# Patient Record
Sex: MALE | Race: WHITE | Employment: FULL TIME
[De-identification: names, ages, dates, MRNs, and addresses within clinical notes are randomized per-mention and may not be internally consistent; named-entity substitution may affect disease eponyms.]

---

## 2022-03-19 PROBLEM — F41.9 ANXIETY: Status: ACTIVE | Noted: 2019-03-05

## 2022-08-29 DIAGNOSIS — Z13.89 SCREENING FOR BLOOD OR PROTEIN IN URINE: ICD-10-CM

## 2022-08-29 DIAGNOSIS — Z13.220 SCREENING FOR HYPERLIPIDEMIA: Primary | ICD-10-CM

## 2022-08-29 DIAGNOSIS — Z13.29 SCREENING FOR THYROID DISORDER: ICD-10-CM

## 2022-08-29 DIAGNOSIS — Z13.220 SCREENING FOR HYPERCHOLESTEROLEMIA: ICD-10-CM

## 2022-08-30 ENCOUNTER — NURSE ONLY (OUTPATIENT)
Dept: FAMILY MEDICINE CLINIC | Facility: CLINIC | Age: 32
End: 2022-08-30

## 2022-08-30 DIAGNOSIS — Z13.89 SCREENING FOR BLOOD OR PROTEIN IN URINE: ICD-10-CM

## 2022-08-30 DIAGNOSIS — Z13.29 SCREENING FOR THYROID DISORDER: ICD-10-CM

## 2022-08-30 DIAGNOSIS — Z13.220 SCREENING FOR HYPERLIPIDEMIA: ICD-10-CM

## 2022-08-30 DIAGNOSIS — Z13.220 SCREENING FOR HYPERCHOLESTEROLEMIA: ICD-10-CM

## 2022-08-30 LAB
ALBUMIN SERPL-MCNC: 4.1 G/DL (ref 3.5–5)
ALBUMIN/GLOB SERPL: 1.3 {RATIO} (ref 1.2–3.5)
ALP SERPL-CCNC: 69 U/L (ref 50–136)
ALT SERPL-CCNC: 24 U/L (ref 12–65)
ANION GAP SERPL CALC-SCNC: 5 MMOL/L (ref 7–16)
AST SERPL-CCNC: 16 U/L (ref 15–37)
BILIRUB SERPL-MCNC: 0.9 MG/DL (ref 0.2–1.1)
BUN SERPL-MCNC: 13 MG/DL (ref 6–23)
CALCIUM SERPL-MCNC: 9.2 MG/DL (ref 8.3–10.4)
CHLORIDE SERPL-SCNC: 105 MMOL/L (ref 98–107)
CHOLEST SERPL-MCNC: 189 MG/DL
CO2 SERPL-SCNC: 28 MMOL/L (ref 21–32)
CREAT SERPL-MCNC: 1 MG/DL (ref 0.8–1.5)
GLOBULIN SER CALC-MCNC: 3.2 G/DL (ref 2.3–3.5)
GLUCOSE SERPL-MCNC: 91 MG/DL (ref 65–100)
HDLC SERPL-MCNC: 35 MG/DL (ref 40–60)
HDLC SERPL: 5.4 {RATIO}
LDLC SERPL CALC-MCNC: 114 MG/DL
POTASSIUM SERPL-SCNC: 3.9 MMOL/L (ref 3.5–5.1)
PROT SERPL-MCNC: 7.3 G/DL (ref 6.3–8.2)
SODIUM SERPL-SCNC: 138 MMOL/L (ref 138–145)
TRIGL SERPL-MCNC: 200 MG/DL (ref 35–150)
TSH, 3RD GENERATION: 1.73 UIU/ML (ref 0.36–3.74)
VLDLC SERPL CALC-MCNC: 40 MG/DL (ref 6–23)

## 2022-08-31 LAB
APPEARANCE UR: CLEAR
BACTERIA URNS QL MICRO: NEGATIVE /HPF
BASOPHILS # BLD: 0 K/UL (ref 0–0.2)
BASOPHILS NFR BLD: 1 % (ref 0–2)
BILIRUB UR QL: NEGATIVE
CASTS URNS QL MICRO: ABNORMAL /LPF
COLOR UR: ABNORMAL
DIFFERENTIAL METHOD BLD: ABNORMAL
EOSINOPHIL # BLD: 0.2 K/UL (ref 0–0.8)
EOSINOPHIL NFR BLD: 3 % (ref 0.5–7.8)
EPI CELLS #/AREA URNS HPF: ABNORMAL /HPF
ERYTHROCYTE [DISTWIDTH] IN BLOOD BY AUTOMATED COUNT: 13.1 % (ref 11.9–14.6)
GLUCOSE UR STRIP.AUTO-MCNC: NEGATIVE MG/DL
HCT VFR BLD AUTO: 49.3 % (ref 41.1–50.3)
HGB BLD-MCNC: 15.4 G/DL (ref 13.6–17.2)
HGB UR QL STRIP: NEGATIVE
IMM GRANULOCYTES # BLD AUTO: 0 K/UL (ref 0–0.5)
IMM GRANULOCYTES NFR BLD AUTO: 0 % (ref 0–5)
KETONES UR QL STRIP.AUTO: NEGATIVE MG/DL
LEUKOCYTE ESTERASE UR QL STRIP.AUTO: NEGATIVE
LYMPHOCYTES # BLD: 1.8 K/UL (ref 0.5–4.6)
LYMPHOCYTES NFR BLD: 32 % (ref 13–44)
MCH RBC QN AUTO: 31.7 PG (ref 26.1–32.9)
MCHC RBC AUTO-ENTMCNC: 31.2 G/DL (ref 31.4–35)
MCV RBC AUTO: 101.4 FL (ref 79.6–97.8)
MONOCYTES # BLD: 0.5 K/UL (ref 0.1–1.3)
MONOCYTES NFR BLD: 9 % (ref 4–12)
MUCOUS THREADS URNS QL MICRO: 0 /LPF
NEUTS SEG # BLD: 3 K/UL (ref 1.7–8.2)
NEUTS SEG NFR BLD: 55 % (ref 43–78)
NITRITE UR QL STRIP.AUTO: NEGATIVE
NRBC # BLD: 0 K/UL (ref 0–0.2)
PH UR STRIP: 7.5 [PH] (ref 5–9)
PLATELET # BLD AUTO: 302 K/UL (ref 150–450)
PMV BLD AUTO: 9.9 FL (ref 9.4–12.3)
PROT UR STRIP-MCNC: NEGATIVE MG/DL
RBC # BLD AUTO: 4.86 M/UL (ref 4.23–5.6)
RBC #/AREA URNS HPF: ABNORMAL /HPF
SP GR UR REFRACTOMETRY: 1.02 (ref 1–1.02)
URINE CULTURE IF INDICATED: ABNORMAL
UROBILINOGEN UR QL STRIP.AUTO: 1 EU/DL (ref 0.2–1)
WBC # BLD AUTO: 5.5 K/UL (ref 4.3–11.1)
WBC URNS QL MICRO: ABNORMAL /HPF

## 2022-09-09 ENCOUNTER — OFFICE VISIT (OUTPATIENT)
Dept: FAMILY MEDICINE CLINIC | Facility: CLINIC | Age: 32
End: 2022-09-09
Payer: COMMERCIAL

## 2022-09-09 VITALS
SYSTOLIC BLOOD PRESSURE: 138 MMHG | DIASTOLIC BLOOD PRESSURE: 86 MMHG | RESPIRATION RATE: 18 BRPM | OXYGEN SATURATION: 96 % | WEIGHT: 257.2 LBS | HEART RATE: 100 BPM

## 2022-09-09 DIAGNOSIS — F41.9 ANXIETY DISORDER, UNSPECIFIED TYPE: Primary | ICD-10-CM

## 2022-09-09 DIAGNOSIS — Z00.00 HEALTHCARE MAINTENANCE: ICD-10-CM

## 2022-09-09 PROCEDURE — 99395 PREV VISIT EST AGE 18-39: CPT | Performed by: FAMILY MEDICINE

## 2022-09-09 RX ORDER — ESCITALOPRAM OXALATE 10 MG/1
5 TABLET ORAL DAILY
Qty: 90 TABLET | Refills: 3 | Status: SHIPPED | OUTPATIENT
Start: 2022-09-09

## 2022-09-09 ASSESSMENT — PATIENT HEALTH QUESTIONNAIRE - PHQ9
SUM OF ALL RESPONSES TO PHQ QUESTIONS 1-9: 0
2. FEELING DOWN, DEPRESSED OR HOPELESS: 0
1. LITTLE INTEREST OR PLEASURE IN DOING THINGS: 0
SUM OF ALL RESPONSES TO PHQ9 QUESTIONS 1 & 2: 0

## 2022-09-09 ASSESSMENT — ANXIETY QUESTIONNAIRES
1. FEELING NERVOUS, ANXIOUS, OR ON EDGE: 0
5. BEING SO RESTLESS THAT IT IS HARD TO SIT STILL: 0
6. BECOMING EASILY ANNOYED OR IRRITABLE: 0
4. TROUBLE RELAXING: 0
GAD7 TOTAL SCORE: 0
7. FEELING AFRAID AS IF SOMETHING AWFUL MIGHT HAPPEN: 0
2. NOT BEING ABLE TO STOP OR CONTROL WORRYING: 0
IF YOU CHECKED OFF ANY PROBLEMS ON THIS QUESTIONNAIRE, HOW DIFFICULT HAVE THESE PROBLEMS MADE IT FOR YOU TO DO YOUR WORK, TAKE CARE OF THINGS AT HOME, OR GET ALONG WITH OTHER PEOPLE: NOT DIFFICULT AT ALL
3. WORRYING TOO MUCH ABOUT DIFFERENT THINGS: 0

## 2022-09-19 ASSESSMENT — ENCOUNTER SYMPTOMS
EYES NEGATIVE: 1
ABDOMINAL PAIN: 1
RESPIRATORY NEGATIVE: 1

## 2022-09-20 NOTE — PROGRESS NOTES
HISTORY OF PRESENT ILLNESS  Avila Bolaños is a 33 y.o. y.o. male    Other  This is a new (physical) problem. The problem has been gradually improving. Associated symptoms include abdominal pain and arthralgias. Mental Health Problem  Primary symptoms comment: anxiety. The current episode started more than 1 month ago. The onset of the illness is precipitated by a stressful event and emotional stress. Additional symptoms of the illness include abdominal pain. No Known Allergies     Current Outpatient Medications   Medication Sig    escitalopram (LEXAPRO) 10 MG tablet Take 0.5 tablets by mouth daily     No current facility-administered medications for this visit. Past Medical History:   Diagnosis Date    Anxiety     1st year of college        Past Surgical History:   Procedure Laterality Date    APPENDECTOMY  2008        Social History     Socioeconomic History    Marital status: Single     Spouse name: Not on file    Number of children: Not on file    Years of education: Not on file    Highest education level: Not on file   Occupational History    Not on file   Tobacco Use    Smoking status: Former    Smokeless tobacco: Never    Tobacco comments:     Quit smoking: only light smoker in college--none for years   Substance and Sexual Activity    Alcohol use: Yes     Alcohol/week: 10.0 standard drinks    Drug use: Not on file    Sexual activity: Not on file   Other Topics Concern    Not on file   Social History Narrative    Not on file     Social Determinants of Health     Financial Resource Strain: Not on file   Food Insecurity: Not on file   Transportation Needs: Not on file   Physical Activity: Not on file   Stress: Not on file   Social Connections: Not on file   Intimate Partner Violence: Not on file   Housing Stability: Not on file        Review of Systems   Constitutional: Negative. HENT: Negative. Eyes: Negative. Respiratory: Negative. Cardiovascular: Negative.     Gastrointestinal: Positive for abdominal pain. Endocrine: Negative. Genitourinary: Negative. Musculoskeletal:  Positive for arthralgias. Skin: Negative. Neurological: Negative. Psychiatric/Behavioral: Negative. /86   Pulse 100   Resp 18   Wt 257 lb 3.2 oz (116.7 kg)   SpO2 96%      Physical Exam  Constitutional:       General: He is not in acute distress. Appearance: Normal appearance. HENT:      Head: Normocephalic. Nose: Nose normal.   Eyes:      Conjunctiva/sclera: Conjunctivae normal.   Cardiovascular:      Rate and Rhythm: Normal rate. Pulmonary:      Effort: Pulmonary effort is normal.      Breath sounds: Normal breath sounds. Abdominal:      General: Abdomen is flat. Bowel sounds are normal.      Palpations: Abdomen is soft. Musculoskeletal:         General: Normal range of motion. Cervical back: Normal range of motion. Skin:     General: Skin is warm and dry. Neurological:      General: No focal deficit present. Mental Status: He is alert.    Psychiatric:         Mood and Affect: Mood normal.       Nurse Only on 08/30/2022   Component Date Value Ref Range Status    Color, UA 08/30/2022 YELLOW/STRAW    Final    Color Reference Range: Straw, Yellow or Dark Yellow    Appearance 08/30/2022 CLEAR    Final    Specific Gravity, UA 08/30/2022 1.020  1.001 - 1.023   Final    pH, Urine 08/30/2022 7.5  5.0 - 9.0   Final    Protein, UA 08/30/2022 Negative  Negative mg/dL Final    Glucose, UA 08/30/2022 Negative  mg/dL Final    Ketones, Urine 08/30/2022 Negative  Negative mg/dL Final    Bilirubin Urine 08/30/2022 Negative  Negative   Final    Blood, Urine 08/30/2022 Negative  Negative   Final    Urobilinogen, Urine 08/30/2022 1.0  0.2 - 1.0 EU/dL Final    Nitrite, Urine 08/30/2022 Negative  Negative   Final    Leukocyte Esterase, Urine 08/30/2022 Negative  Negative   Final    Urine Culture if Indicated 08/30/2022 CULTURE NOT INDICATED BY UA RESULT    Final    WBC, UA 08/30/2022 0-4 (A) NORMAL /hpf Final    RBC, UA 08/30/2022 0-5 (A) NORMAL /hpf Final    BACTERIA, URINE 08/30/2022 Negative (A) NORMAL /hpf Final    Epithelial Cells UA 08/30/2022 0-5 (A) NORMAL /hpf Final    Casts 08/30/2022 0-2 (A) NORMAL /lpf Final    Mucus, UA 08/30/2022 0  0 /lpf Final    TSH, 3RD GENERATION 08/30/2022 1.730  0.358 - 3.740 uIU/mL Final    Cholesterol, Total 08/30/2022 189  <200 MG/DL Final    Comment: Borderline High: 200-239 mg/dL  High: Greater than or equal to 240 mg/dL      Triglycerides 08/30/2022 200 (A) 35 - 150 MG/DL Final    Comment: Borderline High: 150-199 mg/dL, High: 200-499 mg/dL  Very High: Greater than or equal to 500 mg/dL      HDL 08/30/2022 35 (A) 40 - 60 MG/DL Final    LDL Calculated 08/30/2022 114 (A) <100 MG/DL Final    Comment: Near Optimal: 100-129 mg/dL  Borderline High: 130-159, High: 160-189 mg/dL  Very High: Greater than or equal to 190 mg/dL      VLDL Cholesterol Calculated 08/30/2022 40 (A) 6.0 - 23.0 MG/DL Final    Chol/HDL Ratio 08/30/2022 5.4    Final    Sodium 08/30/2022 138  138 - 145 mmol/L Final    Potassium 08/30/2022 3.9  3.5 - 5.1 mmol/L Final    Chloride 08/30/2022 105  98 - 107 mmol/L Final    CO2 08/30/2022 28  21 - 32 mmol/L Final    Anion Gap 08/30/2022 5 (A) 7 - 16 mmol/L Final    Glucose 08/30/2022 91  65 - 100 mg/dL Final    BUN 08/30/2022 13  6 - 23 MG/DL Final    Creatinine 08/30/2022 1.00  0.8 - 1.5 MG/DL Final    GFR  08/30/2022 >60  >60 ml/min/1.73m2 Final    GFR Non- 08/30/2022 >60  >60 ml/min/1.73m2 Final    Comment:   Estimated GFR is calculated using the Modification of Diet in Renal Disease (MDRD) Study equation, reported for both  Americans (GFRAA) and non- Americans (GFRNA), and normalized to 1.73m2 body surface area. The physician must decide which value applies to the patient. The MDRD study equation should only be used in individuals age 25 or older.  It has not been validated for the following: pregnant women, patients with serious comorbid conditions,or on certain medications, or persons with extremes of body size, muscle mass, or nutritional status.       Calcium 08/30/2022 9.2  8.3 - 10.4 MG/DL Final    Total Bilirubin 08/30/2022 0.9  0.2 - 1.1 MG/DL Final    ALT 08/30/2022 24  12 - 65 U/L Final    AST 08/30/2022 16  15 - 37 U/L Final    Alk Phosphatase 08/30/2022 69  50 - 136 U/L Final    Total Protein 08/30/2022 7.3  6.3 - 8.2 g/dL Final    Albumin 08/30/2022 4.1  3.5 - 5.0 g/dL Final    Globulin 08/30/2022 3.2  2.3 - 3.5 g/dL Final    Albumin/Globulin Ratio 08/30/2022 1.3  1.2 - 3.5   Final    WBC 08/30/2022 5.5  4.3 - 11.1 K/uL Final    RBC 08/30/2022 4.86  4.23 - 5.6 M/uL Final    Hemoglobin 08/30/2022 15.4  13.6 - 17.2 g/dL Final    Hematocrit 08/30/2022 49.3  41.1 - 50.3 % Final    MCV 08/30/2022 101.4 (A) 79.6 - 97.8 FL Final    MCH 08/30/2022 31.7  26.1 - 32.9 PG Final    MCHC 08/30/2022 31.2 (A) 31.4 - 35.0 g/dL Final    RDW 08/30/2022 13.1  11.9 - 14.6 % Final    Platelets 94/48/6729 302  150 - 450 K/uL Final    MPV 08/30/2022 9.9  9.4 - 12.3 FL Final    nRBC 08/30/2022 0.00  0.0 - 0.2 K/uL Final    **Note: Absolute NRBC parameter is now reported with Hemogram**    Differential Type 08/30/2022 AUTOMATED    Final    Seg Neutrophils 08/30/2022 55  43 - 78 % Final    Lymphocytes 08/30/2022 32  13 - 44 % Final    Monocytes 08/30/2022 9  4.0 - 12.0 % Final    Eosinophils % 08/30/2022 3  0.5 - 7.8 % Final    Basophils 08/30/2022 1  0.0 - 2.0 % Final    Immature Granulocytes 08/30/2022 0  0.0 - 5.0 % Final    Segs Absolute 08/30/2022 3.0  1.7 - 8.2 K/UL Final    Absolute Lymph # 08/30/2022 1.8  0.5 - 4.6 K/UL Final    Absolute Mono # 08/30/2022 0.5  0.1 - 1.3 K/UL Final    Absolute Eos # 08/30/2022 0.2  0.0 - 0.8 K/UL Final    Basophils Absolute 08/30/2022 0.0  0.0 - 0.2 K/UL Final    Absolute Immature Granulocyte 08/30/2022 0.0  0.0 - 0.5 K/UL Final       ASSESSMENT and PLAN    Anxiety disorder, unspecified type  Healthcare maintenance      Current treatment plan is effective. no changes in therapy  lab results and schedule for future lab studies reviewed with patient  reviewed diet, exercise and weight control   Cardiovascular risk and specific lipid/ LDL goals reviewed    No follow-ups on file.      Jessica Arce MD

## 2023-03-03 ENCOUNTER — OFFICE VISIT (OUTPATIENT)
Dept: FAMILY MEDICINE CLINIC | Facility: CLINIC | Age: 33
End: 2023-03-03
Payer: COMMERCIAL

## 2023-03-03 VITALS
DIASTOLIC BLOOD PRESSURE: 78 MMHG | OXYGEN SATURATION: 99 % | TEMPERATURE: 97.3 F | HEIGHT: 76 IN | BODY MASS INDEX: 31.78 KG/M2 | HEART RATE: 85 BPM | SYSTOLIC BLOOD PRESSURE: 126 MMHG | WEIGHT: 261 LBS

## 2023-03-03 DIAGNOSIS — F41.9 ANXIETY DISORDER, UNSPECIFIED TYPE: Primary | ICD-10-CM

## 2023-03-03 PROCEDURE — 99213 OFFICE O/P EST LOW 20 MIN: CPT | Performed by: FAMILY MEDICINE

## 2023-03-03 RX ORDER — ESCITALOPRAM OXALATE 10 MG/1
5 TABLET ORAL DAILY
Qty: 90 TABLET | Refills: 1 | Status: SHIPPED | OUTPATIENT
Start: 2023-03-03

## 2023-03-03 SDOH — ECONOMIC STABILITY: INCOME INSECURITY: HOW HARD IS IT FOR YOU TO PAY FOR THE VERY BASICS LIKE FOOD, HOUSING, MEDICAL CARE, AND HEATING?: NOT HARD AT ALL

## 2023-03-03 SDOH — ECONOMIC STABILITY: FOOD INSECURITY: WITHIN THE PAST 12 MONTHS, YOU WORRIED THAT YOUR FOOD WOULD RUN OUT BEFORE YOU GOT MONEY TO BUY MORE.: NEVER TRUE

## 2023-03-03 SDOH — ECONOMIC STABILITY: HOUSING INSECURITY
IN THE LAST 12 MONTHS, WAS THERE A TIME WHEN YOU DID NOT HAVE A STEADY PLACE TO SLEEP OR SLEPT IN A SHELTER (INCLUDING NOW)?: NO

## 2023-03-03 SDOH — ECONOMIC STABILITY: FOOD INSECURITY: WITHIN THE PAST 12 MONTHS, THE FOOD YOU BOUGHT JUST DIDN'T LAST AND YOU DIDN'T HAVE MONEY TO GET MORE.: NEVER TRUE

## 2023-03-03 ASSESSMENT — ENCOUNTER SYMPTOMS
RESPIRATORY NEGATIVE: 1
GASTROINTESTINAL NEGATIVE: 1
EYES NEGATIVE: 1

## 2023-03-03 ASSESSMENT — PATIENT HEALTH QUESTIONNAIRE - PHQ9
SUM OF ALL RESPONSES TO PHQ QUESTIONS 1-9: 0
SUM OF ALL RESPONSES TO PHQ QUESTIONS 1-9: 0
1. LITTLE INTEREST OR PLEASURE IN DOING THINGS: 0
SUM OF ALL RESPONSES TO PHQ QUESTIONS 1-9: 0
SUM OF ALL RESPONSES TO PHQ9 QUESTIONS 1 & 2: 0
2. FEELING DOWN, DEPRESSED OR HOPELESS: 0
SUM OF ALL RESPONSES TO PHQ QUESTIONS 1-9: 0

## 2023-03-03 NOTE — PROGRESS NOTES
HISTORY OF PRESENT ILLNESS  Radha Douglas is a 29 y.o. y.o. male    Anxiety  Presents for follow-up (stable) visit. Weight Management  This is a recurrent problem. The problem has been unchanged. No Known Allergies     Current Outpatient Medications   Medication Sig    escitalopram (LEXAPRO) 10 MG tablet Take 0.5 tablets by mouth daily     No current facility-administered medications for this visit. Past Medical History:   Diagnosis Date    Anxiety     1st year of college        Past Surgical History:   Procedure Laterality Date    APPENDECTOMY  2008        Social History     Socioeconomic History    Marital status: Single     Spouse name: Not on file    Number of children: Not on file    Years of education: Not on file    Highest education level: Not on file   Occupational History    Not on file   Tobacco Use    Smoking status: Former    Smokeless tobacco: Never    Tobacco comments:     Quit smoking: only light smoker in college--none for years   Substance and Sexual Activity    Alcohol use: Yes     Alcohol/week: 10.0 standard drinks    Drug use: Not on file    Sexual activity: Not on file   Other Topics Concern    Not on file   Social History Narrative    Not on file     Social Determinants of Health     Financial Resource Strain: Low Risk     Difficulty of Paying Living Expenses: Not hard at all   Food Insecurity: No Food Insecurity    Worried About [address] in the Last Year: Never true    [address] in the Last Year: Never true   Transportation Needs: Unknown    Lack of Transportation (Medical): Not on file    Lack of Transportation (Non-Medical):  No   Physical Activity: Not on file   Stress: Not on file   Social Connections: Not on file   Intimate Partner Violence: Not on file   Housing Stability: Unknown    Unable to Pay for Housing in the Last Year: Not on file    Number of Places Lived in the Last Year: Not on file    Unstable Housing in the Last Year: No        Review of Systems   Constitutional: Negative. HENT: Negative. Eyes: Negative. Respiratory: Negative. Cardiovascular: Negative. Gastrointestinal: Negative. Endocrine: Negative. Genitourinary: Negative. Skin: Negative. Neurological: Negative. Psychiatric/Behavioral: Negative. /78   Pulse 85   Temp 97.3 °F (36.3 °C) (Temporal)   Ht 6' 4\" (1.93 m)   Wt 261 lb (118.4 kg)   SpO2 99%   BMI 31.77 kg/m²      Physical Exam  Constitutional:       General: He is not in acute distress. Appearance: Normal appearance. HENT:      Head: Normocephalic. Nose: Nose normal.   Eyes:      Conjunctiva/sclera: Conjunctivae normal.   Cardiovascular:      Rate and Rhythm: Normal rate. Pulmonary:      Effort: Pulmonary effort is normal.      Breath sounds: Normal breath sounds. Abdominal:      General: Abdomen is flat. Bowel sounds are normal.      Palpations: Abdomen is soft. Musculoskeletal:         General: Normal range of motion. Cervical back: Normal range of motion. Skin:     General: Skin is warm and dry. Neurological:      General: No focal deficit present. Mental Status: He is alert.    Psychiatric:         Mood and Affect: Mood normal.       Nurse Only on 08/30/2022   Component Date Value Ref Range Status    Color, UA 08/30/2022 YELLOW/STRAW    Final    Color Reference Range: Straw, Yellow or Dark Yellow    Appearance 08/30/2022 CLEAR    Final    Specific Gravity, UA 08/30/2022 1.020  1.001 - 1.023   Final    pH, Urine 08/30/2022 7.5  5.0 - 9.0   Final    Protein, UA 08/30/2022 Negative  Negative mg/dL Final    Glucose, UA 08/30/2022 Negative  mg/dL Final    Ketones, Urine 08/30/2022 Negative  Negative mg/dL Final    Bilirubin Urine 08/30/2022 Negative  Negative   Final    Blood, Urine 08/30/2022 Negative  Negative   Final    Urobilinogen, Urine 08/30/2022 1.0  0.2 - 1.0 EU/dL Final    Nitrite, Urine 08/30/2022 Negative  Negative   Final    Leukocyte Esterase, Urine 08/30/2022 Negative  Negative   Final    Urine Culture if Indicated 08/30/2022 CULTURE NOT INDICATED BY UA RESULT    Final    WBC, UA 08/30/2022 0-4 (A)  NORMAL /hpf Final    RBC, UA 08/30/2022 0-5 (A)  NORMAL /hpf Final    BACTERIA, URINE 08/30/2022 Negative (A)  NORMAL /hpf Final    Epithelial Cells UA 08/30/2022 0-5 (A)  NORMAL /hpf Final    Casts 08/30/2022 0-2 (A)  NORMAL /lpf Final    Mucus, UA 08/30/2022 0  0 /lpf Final    TSH, 3RD GENERATION 08/30/2022 1.730  0.358 - 3.740 uIU/mL Final    Cholesterol, Total 08/30/2022 189  <200 MG/DL Final    Comment: Borderline High: 200-239 mg/dL  High: Greater than or equal to 240 mg/dL      Triglycerides 08/30/2022 200 (A)  35 - 150 MG/DL Final    Comment: Borderline High: 150-199 mg/dL, High: 200-499 mg/dL  Very High: Greater than or equal to 500 mg/dL      HDL 08/30/2022 35 (A)  40 - 60 MG/DL Final    LDL Calculated 08/30/2022 114 (A)  <100 MG/DL Final    Comment: Near Optimal: 100-129 mg/dL  Borderline High: 130-159, High: 160-189 mg/dL  Very High: Greater than or equal to 190 mg/dL      VLDL Cholesterol Calculated 08/30/2022 40 (A)  6.0 - 23.0 MG/DL Final    Chol/HDL Ratio 08/30/2022 5.4    Final    Sodium 08/30/2022 138  138 - 145 mmol/L Final    Potassium 08/30/2022 3.9  3.5 - 5.1 mmol/L Final    Chloride 08/30/2022 105  98 - 107 mmol/L Final    CO2 08/30/2022 28  21 - 32 mmol/L Final    Anion Gap 08/30/2022 5 (A)  7 - 16 mmol/L Final    Glucose 08/30/2022 91  65 - 100 mg/dL Final    BUN 08/30/2022 13  6 - 23 MG/DL Final    Creatinine 08/30/2022 1.00  0.8 - 1.5 MG/DL Final    GFR  08/30/2022 >60  >60 ml/min/1.73m2 Final    GFR Non- 08/30/2022 >60  >60 ml/min/1.73m2 Final    Comment:   Estimated GFR is calculated using the Modification of Diet in Renal Disease (MDRD) Study equation, reported for both  Americans (GFRAA) and non- Americans (GFRNA), and normalized to 1.73m2 body surface area.  The physician must decide which value applies to the patient. The MDRD study equation should only be used in individuals age 25 or older. It has not been validated for the following: pregnant women, patients with serious comorbid conditions,or on certain medications, or persons with extremes of body size, muscle mass, or nutritional status.       Calcium 08/30/2022 9.2  8.3 - 10.4 MG/DL Final    Total Bilirubin 08/30/2022 0.9  0.2 - 1.1 MG/DL Final    ALT 08/30/2022 24  12 - 65 U/L Final    AST 08/30/2022 16  15 - 37 U/L Final    Alk Phosphatase 08/30/2022 69  50 - 136 U/L Final    Total Protein 08/30/2022 7.3  6.3 - 8.2 g/dL Final    Albumin 08/30/2022 4.1  3.5 - 5.0 g/dL Final    Globulin 08/30/2022 3.2  2.3 - 3.5 g/dL Final    Albumin/Globulin Ratio 08/30/2022 1.3  1.2 - 3.5   Final    WBC 08/30/2022 5.5  4.3 - 11.1 K/uL Final    RBC 08/30/2022 4.86  4.23 - 5.6 M/uL Final    Hemoglobin 08/30/2022 15.4  13.6 - 17.2 g/dL Final    Hematocrit 08/30/2022 49.3  41.1 - 50.3 % Final    MCV 08/30/2022 101.4 (A)  79.6 - 97.8 FL Final    MCH 08/30/2022 31.7  26.1 - 32.9 PG Final    MCHC 08/30/2022 31.2 (A)  31.4 - 35.0 g/dL Final    RDW 08/30/2022 13.1  11.9 - 14.6 % Final    Platelets 46/56/2566 302  150 - 450 K/uL Final    MPV 08/30/2022 9.9  9.4 - 12.3 FL Final    nRBC 08/30/2022 0.00  0.0 - 0.2 K/uL Final    **Note: Absolute NRBC parameter is now reported with Hemogram**    Differential Type 08/30/2022 AUTOMATED    Final    Seg Neutrophils 08/30/2022 55  43 - 78 % Final    Lymphocytes 08/30/2022 32  13 - 44 % Final    Monocytes 08/30/2022 9  4.0 - 12.0 % Final    Eosinophils % 08/30/2022 3  0.5 - 7.8 % Final    Basophils 08/30/2022 1  0.0 - 2.0 % Final    Immature Granulocytes 08/30/2022 0  0.0 - 5.0 % Final    Segs Absolute 08/30/2022 3.0  1.7 - 8.2 K/UL Final    Absolute Lymph # 08/30/2022 1.8  0.5 - 4.6 K/UL Final    Absolute Mono # 08/30/2022 0.5  0.1 - 1.3 K/UL Final    Absolute Eos # 08/30/2022 0.2  0.0 - 0.8 K/UL Final    Basophils Absolute 08/30/2022 0.0  0.0 - 0.2 K/UL Final    Absolute Immature Granulocyte 08/30/2022 0.0  0.0 - 0.5 K/UL Final       ASSESSMENT and PLAN    Anxiety disorder, unspecified type      Current treatment plan is effective. no changes in therapy    No follow-ups on file.      Chris Rodrigues MD

## 2023-09-05 ENCOUNTER — TELEPHONE (OUTPATIENT)
Dept: FAMILY MEDICINE CLINIC | Facility: CLINIC | Age: 33
End: 2023-09-05

## 2023-09-05 DIAGNOSIS — Z13.220 SCREENING FOR HYPERCHOLESTEROLEMIA: ICD-10-CM

## 2023-09-05 DIAGNOSIS — Z00.00 ENCOUNTER FOR ANNUAL PHYSICAL EXAM: Primary | ICD-10-CM

## 2023-09-05 DIAGNOSIS — Z13.220 SCREENING FOR HYPERLIPIDEMIA: ICD-10-CM

## 2023-09-05 DIAGNOSIS — Z13.29 SCREENING FOR THYROID DISORDER: ICD-10-CM

## 2023-09-05 DIAGNOSIS — Z13.89 SCREENING FOR BLOOD OR PROTEIN IN URINE: ICD-10-CM

## 2023-09-05 NOTE — TELEPHONE ENCOUNTER
Left voicemail after patient no showed lab appointment. Patient advised that his or her upcoming appointment will be cancelled if we do not hear back from them by end of day today.

## 2024-03-29 RX ORDER — ESCITALOPRAM OXALATE 10 MG/1
5 TABLET ORAL DAILY
Qty: 90 TABLET | Refills: 1 | OUTPATIENT
Start: 2024-03-29